# Patient Record
Sex: FEMALE | Race: WHITE | URBAN - METROPOLITAN AREA
[De-identification: names, ages, dates, MRNs, and addresses within clinical notes are randomized per-mention and may not be internally consistent; named-entity substitution may affect disease eponyms.]

---

## 2017-01-16 ENCOUNTER — OFFICE VISIT (OUTPATIENT)
Dept: INTERNAL MEDICINE CLINIC | Facility: CLINIC | Age: 27
End: 2017-01-16

## 2017-01-16 ENCOUNTER — APPOINTMENT (OUTPATIENT)
Dept: LAB | Age: 27
End: 2017-01-16
Attending: INTERNAL MEDICINE
Payer: COMMERCIAL

## 2017-01-16 VITALS
HEIGHT: 62.5 IN | DIASTOLIC BLOOD PRESSURE: 80 MMHG | HEART RATE: 75 BPM | SYSTOLIC BLOOD PRESSURE: 118 MMHG | TEMPERATURE: 98 F | BODY MASS INDEX: 21.68 KG/M2 | WEIGHT: 120.81 LBS | OXYGEN SATURATION: 96 % | RESPIRATION RATE: 16 BRPM

## 2017-01-16 DIAGNOSIS — R89.9 ABNORMAL LABORATORY TEST: Primary | ICD-10-CM

## 2017-01-16 DIAGNOSIS — R89.9 ABNORMAL LABORATORY TEST: ICD-10-CM

## 2017-01-16 PROBLEM — E04.1 THYROID NODULE: Status: ACTIVE | Noted: 2017-01-16

## 2017-01-16 LAB
DEPRECATED HBV CORE AB SER IA-ACNC: 22.9 NG/ML (ref 10–291)
IRON SATURATION: 23 % (ref 13–45)
IRON: 108 UG/DL (ref 28–170)
TOTAL IRON BINDING CAPACITY: 465 UG/DL (ref 298–536)
TRANSFERRIN: 312 MG/DL (ref 200–360)

## 2017-01-16 PROCEDURE — 83540 ASSAY OF IRON: CPT

## 2017-01-16 PROCEDURE — 83550 IRON BINDING TEST: CPT

## 2017-01-16 PROCEDURE — 99203 OFFICE O/P NEW LOW 30 MIN: CPT | Performed by: INTERNAL MEDICINE

## 2017-01-16 PROCEDURE — 82728 ASSAY OF FERRITIN: CPT

## 2017-01-16 RX ORDER — GLUCOSAMINE HCL 500 MG
1 TABLET ORAL DAILY
COMMUNITY

## 2022-08-11 NOTE — PROGRESS NOTES
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      TM will need to complete two PCR tests.  Test 1: 8-2-22 NEG  Test 2: 8-9-22 NEG    All testing completed, AM regina'd   tenderness  EXTREMITIES: no cyanosis, clubbing or edema    No results found for this or any previous visit.         ASSESSMENT AND PLAN:   Abnormal laboratory test  (primary encounter diagnosis) high iron, doubt clinical significance but father had [address]